# Patient Record
Sex: FEMALE | Race: WHITE | NOT HISPANIC OR LATINO | URBAN - NONMETROPOLITAN AREA
[De-identification: names, ages, dates, MRNs, and addresses within clinical notes are randomized per-mention and may not be internally consistent; named-entity substitution may affect disease eponyms.]

---

## 2022-06-14 ENCOUNTER — WEB ENCOUNTER (OUTPATIENT)
Dept: URBAN - NONMETROPOLITAN AREA CLINIC 2 | Facility: CLINIC | Age: 72
End: 2022-06-14

## 2022-06-14 ENCOUNTER — OFFICE VISIT (OUTPATIENT)
Dept: URBAN - NONMETROPOLITAN AREA CLINIC 2 | Facility: CLINIC | Age: 72
End: 2022-06-14
Payer: MEDICARE

## 2022-06-14 VITALS
WEIGHT: 187 LBS | HEART RATE: 69 BPM | HEIGHT: 63 IN | BODY MASS INDEX: 33.13 KG/M2 | SYSTOLIC BLOOD PRESSURE: 127 MMHG | DIASTOLIC BLOOD PRESSURE: 80 MMHG

## 2022-06-14 DIAGNOSIS — R13.19 ESOPHAGEAL DYSPHAGIA: ICD-10-CM

## 2022-06-14 DIAGNOSIS — Z86.010 PERSONAL HISTORY OF COLONIC POLYPS: ICD-10-CM

## 2022-06-14 DIAGNOSIS — Z80.0 FAMILY HISTORY OF COLON CANCER: ICD-10-CM

## 2022-06-14 PROCEDURE — 99203 OFFICE O/P NEW LOW 30 MIN: CPT | Performed by: NURSE PRACTITIONER

## 2022-06-14 RX ORDER — COLESEVELAM HYDROCHLORIDE 625 MG/1
TAKE 3 TABLETS (1,875 MG) BY ORAL ROUTE 2 TIMES PER DAY WITH MEALS AND LIQUID TABLET, FILM COATED ORAL 2
Qty: 0 | Refills: 0 | Status: ACTIVE | COMMUNITY
Start: 1900-01-01 | End: 1900-01-01

## 2022-06-14 RX ORDER — MECLIZINE HYDROCHLORIDE 12.5 MG/1
TAKE 1-2 TABLETS BY ORAL ROUTE PRN TABLET ORAL 1
Qty: 0 | Refills: 0 | Status: ACTIVE | COMMUNITY
Start: 1900-01-01 | End: 1900-01-01

## 2022-06-14 RX ORDER — OMEPRAZOLE 40 MG/1
1 CAPSULE 30 MINUTES BEFORE MORNING MEAL CAPSULE, DELAYED RELEASE ORAL ONCE A DAY
Qty: 90 | Refills: 3 | Status: ACTIVE | COMMUNITY
Start: 2020-06-19

## 2022-06-14 RX ORDER — ERYTHROMYCIN 5 MG/G
APPLY 1 CM RIBBON INTO THE LOWER CONJUNCTIVAL SAC(S) IN THE AFFECTED EYE(S) BY OPHTHALMIC ROUTE AS DIRECTED OINTMENT OPHTHALMIC 1
Qty: 1 | Refills: 0 | Status: ACTIVE | COMMUNITY
Start: 1900-01-01 | End: 1900-01-01

## 2022-06-14 RX ORDER — MELOXICAM 7.5 MG/1
TAKE AS DIRECTED TABLET ORAL
Qty: 0 | Refills: 0 | Status: ACTIVE | COMMUNITY
Start: 1900-01-01 | End: 1900-01-01

## 2022-06-14 RX ORDER — NITROFURANTOIN 100 MG/1
TAKE AS DIRECTED CAPSULE ORAL
Qty: 0 | Refills: 0 | Status: ACTIVE | COMMUNITY
Start: 1900-01-01 | End: 1900-01-01

## 2022-06-14 RX ORDER — OMEPRAZOLE 20 MG/1
TAKE 1 CAPSULE (20 MG) BY ORAL ROUTE ONCE DAILY BEFORE A MEAL CAPSULE, DELAYED RELEASE ORAL 1
Qty: 0 | Refills: 0 | Status: ACTIVE | COMMUNITY
Start: 1900-01-01 | End: 1900-01-01

## 2022-06-14 RX ORDER — TRAMADOL HYDROCHLORIDE AND ACETAMINOPHEN 37.5; 325 MG/1; MG/1
TAKE 1-2 PO TID PRN PAIN TABLET, COATED ORAL
Qty: 0 | Refills: 0 | Status: ACTIVE | COMMUNITY
Start: 1900-01-01 | End: 1900-01-01

## 2022-06-14 RX ORDER — HYOSCYAMINE SULFATE 0.12 MG/1
TAKE 1 TABLET (0.125 MG) BY ORAL ROUTE 4 TIMES PER DAY FOR 30 DAYS TABLET ORAL
Qty: 120 | Refills: 3 | Status: ACTIVE | COMMUNITY
Start: 1900-01-01 | End: 1900-01-01

## 2022-06-14 RX ORDER — NYSTATIN AND TRIAMCINOLONE ACETONIDE 100000; 1 MG/G; MG/G
APPLY TO THE AFFECTED AREA(S) BY TOPICAL ROUT AS DIRECTED CREAM TOPICAL 2
Qty: 1 | Refills: 0 | Status: ACTIVE | COMMUNITY
Start: 1900-01-01 | End: 1900-01-01

## 2022-06-14 RX ORDER — SODIUM PICOSULFATE, MAGNESIUM OXIDE, AND ANHYDROUS CITRIC ACID 10; 3.5; 12 MG/16.2G; G/16.2G; G/16.2G
AS DIRECTED POWDER, METERED ORAL
Qty: 1 | Refills: 0 | Status: ACTIVE | COMMUNITY
Start: 2016-07-05 | End: 1900-01-01

## 2022-06-14 RX ORDER — SODIUM PICOSULFATE, MAGNESIUM OXIDE, AND ANHYDROUS CITRIC ACID 10; 3.5; 12 MG/160ML; G/160ML; G/160ML
160 ML LIQUID ORAL AS DIRECTED
Qty: 1 KIT | Refills: 0 | OUTPATIENT
Start: 2022-06-14 | End: 2022-06-16

## 2022-06-14 NOTE — HPI-TODAY'S VISIT:
Elli is a 71-year-old female who presents to schedule her screening colonoscopy.  She does have a history of colon polyps.  Also, her mother  of cancer of unknown origin.  She does report that they eventually found her there was some in her colon.  She is due for repeat.  She reports she is doing well from a GI standpoint.  She does take Metamucil daily and this helps her.  She does have a history of requiring EGD with dilation, but states that her swallowing is doing well at this time.  Past medical history is pertinent for diabetes.  She does not take any blood thinners.  She is pretty active and does swimming and aerobics. Sb

## 2022-07-08 ENCOUNTER — TELEPHONE ENCOUNTER (OUTPATIENT)
Dept: URBAN - NONMETROPOLITAN AREA CLINIC 2 | Facility: CLINIC | Age: 72
End: 2022-07-08

## 2022-07-08 RX ORDER — POLYETHYLENE GLYCOL 3350, SODIUM SULFATE ANHYDROUS, SODIUM BICARBONATE, SODIUM CHLORIDE, POTASSIUM CHLORIDE 236; 22.74; 6.74; 5.86; 2.97 G/4L; G/4L; G/4L; G/4L; G/4L
AS DIRECTED POWDER, FOR SOLUTION ORAL ONCE
Qty: 1 GALLON | Refills: 0 | OUTPATIENT
Start: 2022-07-11 | End: 2022-07-12

## 2022-08-23 ENCOUNTER — WEB ENCOUNTER (OUTPATIENT)
Dept: URBAN - NONMETROPOLITAN AREA SURGERY CENTER 1 | Facility: SURGERY CENTER | Age: 72
End: 2022-08-23

## 2022-08-23 ENCOUNTER — OFFICE VISIT (OUTPATIENT)
Dept: URBAN - NONMETROPOLITAN AREA SURGERY CENTER 1 | Facility: SURGERY CENTER | Age: 72
End: 2022-08-23
Payer: MEDICARE

## 2022-08-23 DIAGNOSIS — Z86.010 ADENOMAS PERSONAL HISTORY OF COLONIC POLYPS: ICD-10-CM

## 2022-08-23 DIAGNOSIS — Z09 CHEMOTHERAPY FOLLOW-UP EXAMINATION: ICD-10-CM

## 2022-08-23 PROCEDURE — G0105 COLORECTAL SCRN; HI RISK IND: HCPCS | Performed by: INTERNAL MEDICINE

## 2022-08-23 PROCEDURE — G8907 PT DOC NO EVENTS ON DISCHARG: HCPCS | Performed by: INTERNAL MEDICINE

## 2022-12-16 ENCOUNTER — WEB ENCOUNTER (OUTPATIENT)
Dept: URBAN - NONMETROPOLITAN AREA CLINIC 2 | Facility: CLINIC | Age: 72
End: 2022-12-16

## 2022-12-16 ENCOUNTER — OFFICE VISIT (OUTPATIENT)
Dept: URBAN - NONMETROPOLITAN AREA CLINIC 2 | Facility: CLINIC | Age: 72
End: 2022-12-16
Payer: MEDICARE

## 2022-12-16 VITALS
DIASTOLIC BLOOD PRESSURE: 84 MMHG | HEART RATE: 70 BPM | HEIGHT: 63 IN | WEIGHT: 188 LBS | BODY MASS INDEX: 33.31 KG/M2 | SYSTOLIC BLOOD PRESSURE: 155 MMHG | TEMPERATURE: 97.5 F

## 2022-12-16 DIAGNOSIS — R13.19 ESOPHAGEAL DYSPHAGIA: ICD-10-CM

## 2022-12-16 DIAGNOSIS — Z86.010 PERSONAL HISTORY OF COLONIC POLYPS: ICD-10-CM

## 2022-12-16 DIAGNOSIS — Z80.0 FAMILY HISTORY OF COLON CANCER: ICD-10-CM

## 2022-12-16 PROCEDURE — 99213 OFFICE O/P EST LOW 20 MIN: CPT | Performed by: INTERNAL MEDICINE

## 2022-12-16 RX ORDER — ERYTHROMYCIN 5 MG/G
APPLY 1 CM RIBBON INTO THE LOWER CONJUNCTIVAL SAC(S) IN THE AFFECTED EYE(S) BY OPHTHALMIC ROUTE AS DIRECTED OINTMENT OPHTHALMIC 1
Qty: 1 | Refills: 0 | Status: ACTIVE | COMMUNITY
Start: 1900-01-01

## 2022-12-16 RX ORDER — NITROFURANTOIN 100 MG/1
TAKE AS DIRECTED CAPSULE ORAL
Qty: 0 | Refills: 0 | Status: ACTIVE | COMMUNITY
Start: 1900-01-01

## 2022-12-16 RX ORDER — MECLIZINE HYDROCHLORIDE 12.5 MG/1
TAKE 1-2 TABLETS BY ORAL ROUTE PRN TABLET ORAL 1
Qty: 0 | Refills: 0 | Status: ACTIVE | COMMUNITY
Start: 1900-01-01

## 2022-12-16 RX ORDER — HYOSCYAMINE SULFATE 0.12 MG/1
TAKE 1 TABLET (0.125 MG) BY ORAL ROUTE 4 TIMES PER DAY FOR 30 DAYS TABLET ORAL
Qty: 120 | Refills: 3 | Status: ACTIVE | COMMUNITY
Start: 1900-01-01

## 2022-12-16 RX ORDER — OMEPRAZOLE 40 MG/1
1 CAPSULE 30 MINUTES BEFORE MORNING MEAL CAPSULE, DELAYED RELEASE ORAL ONCE A DAY
Qty: 90 | Refills: 3 | Status: ACTIVE | COMMUNITY
Start: 2020-06-19

## 2022-12-16 RX ORDER — TRAMADOL HYDROCHLORIDE AND ACETAMINOPHEN 37.5; 325 MG/1; MG/1
TAKE 1-2 PO TID PRN PAIN TABLET, COATED ORAL
Qty: 0 | Refills: 0 | Status: ACTIVE | COMMUNITY
Start: 1900-01-01

## 2022-12-16 RX ORDER — MELOXICAM 7.5 MG/1
TAKE AS DIRECTED TABLET ORAL
Qty: 0 | Refills: 0 | Status: ACTIVE | COMMUNITY
Start: 1900-01-01

## 2022-12-16 RX ORDER — NYSTATIN AND TRIAMCINOLONE ACETONIDE 100000; 1 MG/G; MG/G
APPLY TO THE AFFECTED AREA(S) BY TOPICAL ROUT AS DIRECTED CREAM TOPICAL 2
Qty: 1 | Refills: 0 | Status: ACTIVE | COMMUNITY
Start: 1900-01-01

## 2022-12-16 NOTE — HPI-TODAY'S VISIT:
22: Ms. August returns for follow-up of LLQ abdominal pain and constipation.  She had a colonoscopy in August that was normal, no diverticuli noted or polyps.  Today she reports that she is doing well.  She is moving her bowels daily.  22: Elli is a 71-year-old female who presents to schedule her screening colonoscopy.  She does have a history of colon polyps.  Also, her mother  of cancer of unknown origin.  She does report that they eventually found her there was some in her colon.  She is due for repeat.  She reports she is doing well from a GI standpoint.  She does take Metamucil daily and this helps her.  She does have a history of requiring EGD with dilation, but states that her swallowing is doing well at this time.  Past medical history is pertinent for diabetes.  She does not take any blood thinners.  She is pretty active and does swimming and aerobics. Sb

## 2023-02-24 ENCOUNTER — TELEPHONE ENCOUNTER (OUTPATIENT)
Dept: URBAN - NONMETROPOLITAN AREA CLINIC 2 | Facility: CLINIC | Age: 73
End: 2023-02-24

## 2023-03-06 PROBLEM — 428283002: Status: ACTIVE | Noted: 2022-06-14

## 2023-03-06 PROBLEM — 35298007: Status: ACTIVE | Noted: 2023-03-06

## 2023-03-06 PROBLEM — 235595009: Status: ACTIVE | Noted: 2023-03-06

## 2023-03-07 ENCOUNTER — OFFICE VISIT (OUTPATIENT)
Dept: URBAN - METROPOLITAN AREA TELEHEALTH 2 | Facility: TELEHEALTH | Age: 73
End: 2023-03-07
Payer: MEDICARE

## 2023-03-07 VITALS — HEIGHT: 63 IN | BODY MASS INDEX: 33.31 KG/M2 | WEIGHT: 188 LBS

## 2023-03-07 DIAGNOSIS — K59.01 SLOW TRANSIT CONSTIPATION: ICD-10-CM

## 2023-03-07 DIAGNOSIS — Z86.010 PERSONAL HISTORY OF COLONIC POLYPS: ICD-10-CM

## 2023-03-07 DIAGNOSIS — K21.9 GASTROESOPHAGEAL REFLUX DISEASE, UNSPECIFIED WHETHER ESOPHAGITIS PRESENT: ICD-10-CM

## 2023-03-07 DIAGNOSIS — R13.19 ESOPHAGEAL DYSPHAGIA: ICD-10-CM

## 2023-03-07 PROCEDURE — 99214 OFFICE O/P EST MOD 30 MIN: CPT | Performed by: INTERNAL MEDICINE

## 2023-03-07 RX ORDER — MECLIZINE HYDROCHLORIDE 12.5 MG/1
TAKE 1-2 TABLETS BY ORAL ROUTE PRN TABLET ORAL 1
Qty: 0 | Refills: 0 | Status: ACTIVE | COMMUNITY
Start: 1900-01-01

## 2023-03-07 RX ORDER — NITROFURANTOIN 100 MG/1
TAKE AS DIRECTED CAPSULE ORAL
Qty: 0 | Refills: 0 | Status: ACTIVE | COMMUNITY
Start: 1900-01-01

## 2023-03-07 RX ORDER — MELOXICAM 7.5 MG/1
TAKE AS DIRECTED TABLET ORAL
Qty: 0 | Refills: 0 | Status: ACTIVE | COMMUNITY
Start: 1900-01-01

## 2023-03-07 RX ORDER — HYOSCYAMINE SULFATE 0.12 MG/1
TAKE 1 TABLET (0.125 MG) BY ORAL ROUTE 4 TIMES PER DAY FOR 30 DAYS TABLET ORAL
Qty: 120 | Refills: 3 | Status: ACTIVE | COMMUNITY
Start: 1900-01-01

## 2023-03-07 RX ORDER — LINACLOTIDE 145 UG/1
1 CAPSULE AT LEAST 30 MINUTES BEFORE THE FIRST MEAL OF THE DAY ON AN EMPTY STOMACH CAPSULE, GELATIN COATED ORAL ONCE A DAY
Qty: 90 | Refills: 3 | OUTPATIENT
Start: 2023-03-07 | End: 2024-03-01

## 2023-03-07 RX ORDER — TRAMADOL HYDROCHLORIDE AND ACETAMINOPHEN 37.5; 325 MG/1; MG/1
TAKE 1-2 PO TID PRN PAIN TABLET, COATED ORAL
Qty: 0 | Refills: 0 | Status: ACTIVE | COMMUNITY
Start: 1900-01-01

## 2023-03-07 RX ORDER — ERYTHROMYCIN 5 MG/G
APPLY 1 CM RIBBON INTO THE LOWER CONJUNCTIVAL SAC(S) IN THE AFFECTED EYE(S) BY OPHTHALMIC ROUTE AS DIRECTED OINTMENT OPHTHALMIC 1
Qty: 1 | Refills: 0 | Status: ACTIVE | COMMUNITY
Start: 1900-01-01

## 2023-03-07 RX ORDER — NYSTATIN AND TRIAMCINOLONE ACETONIDE 100000; 1 MG/G; MG/G
APPLY TO THE AFFECTED AREA(S) BY TOPICAL ROUT AS DIRECTED CREAM TOPICAL 2
Qty: 1 | Refills: 0 | Status: ACTIVE | COMMUNITY
Start: 1900-01-01

## 2023-03-07 RX ORDER — OMEPRAZOLE 40 MG/1
1 CAPSULE 30 MINUTES BEFORE MORNING MEAL CAPSULE, DELAYED RELEASE ORAL ONCE A DAY
Qty: 90 | Refills: 3 | Status: ACTIVE | COMMUNITY
Start: 2020-06-19

## 2023-03-07 NOTE — HPI-TODAY'S VISIT:
22: Ms. August returns for follow-up of LLQ abdominal pain and constipation.  She had a colonoscopy in August that was normal, no diverticuli noted or polyps.  Today she reports that she is doing well.  She is moving her bowels daily.  22: Elli is a 71-year-old female who presents to schedule her screening colonoscopy.  She does have a history of colon polyps.  Also, her mother  of cancer of unknown origin.  She does report that they eventually found her there was some in her colon.  She is due for repeat.  She reports she is doing well from a GI standpoint.  She does take Metamucil daily and this helps her.  She does have a history of requiring EGD with dilation, but states that her swallowing is doing well at this time.  Past medical history is pertinent for diabetes.  She does not take any blood thinners.  She is pretty active and does swimming and aerobics. Sb Patient returns for a follow-up visit on 3/7/2023.  She states she underwent knee replacement surgery about 2 weeks ago, and was placed on Tylenol 3 and oxycodone.  Shortly thereafter she developed severe constipation, with accompanying abdominal discomfort.  She has since discontinued the Tylenol 3 and oxycodone, and her bowel movements are somewhat improved.  She did call into our office and was instructed to start MiraLAX twice daily with accompanying Colace.  This is helped somewhat, but the constipation abdominal discomfort has persisted.  She does have a history of gastroesophageal reflux, and has been now taking famotidine 40 mg 1-2 times daily her reflux is under control and she is having no swallowing issues.  She has been using anti-inflammatory agents to help control her pain, and I advised her to cut back on these.  She has had a previous history of diverticulitis, which responded to Flagyl, but no episodes recently she did have a normal colonoscopy this past August.

## 2023-03-14 ENCOUNTER — TELEPHONE ENCOUNTER (OUTPATIENT)
Dept: URBAN - NONMETROPOLITAN AREA CLINIC 2 | Facility: CLINIC | Age: 73
End: 2023-03-14

## 2023-03-14 ENCOUNTER — OFFICE VISIT (OUTPATIENT)
Dept: URBAN - METROPOLITAN AREA TELEHEALTH 2 | Facility: TELEHEALTH | Age: 73
End: 2023-03-14
Payer: MEDICARE

## 2023-03-14 DIAGNOSIS — K59.01 SLOW TRANSIT CONSTIPATION: ICD-10-CM

## 2023-03-14 DIAGNOSIS — R13.19 ESOPHAGEAL DYSPHAGIA: ICD-10-CM

## 2023-03-14 PROCEDURE — 99213 OFFICE O/P EST LOW 20 MIN: CPT | Performed by: NURSE PRACTITIONER

## 2023-03-14 RX ORDER — NITROFURANTOIN 100 MG/1
TAKE AS DIRECTED CAPSULE ORAL
Qty: 0 | Refills: 0 | Status: ACTIVE | COMMUNITY
Start: 1900-01-01

## 2023-03-14 RX ORDER — TRAMADOL HYDROCHLORIDE AND ACETAMINOPHEN 37.5; 325 MG/1; MG/1
TAKE 1-2 PO TID PRN PAIN TABLET, COATED ORAL
Qty: 0 | Refills: 0 | Status: ACTIVE | COMMUNITY
Start: 1900-01-01

## 2023-03-14 RX ORDER — MELOXICAM 7.5 MG/1
TAKE AS DIRECTED TABLET ORAL
Qty: 0 | Refills: 0 | Status: ACTIVE | COMMUNITY
Start: 1900-01-01

## 2023-03-14 RX ORDER — HYOSCYAMINE SULFATE 0.125 MG
1 TABLET ON THE TONGUE AND ALLOW TO DISSOLVE  AS NEEDED FOR ABDOMINAL PAIN TABLET,DISINTEGRATING ORAL
Qty: 30 LOZENGE | Refills: 6 | OUTPATIENT
Start: 2023-03-14 | End: 2023-10-10

## 2023-03-14 RX ORDER — MECLIZINE HYDROCHLORIDE 12.5 MG/1
TAKE 1-2 TABLETS BY ORAL ROUTE PRN TABLET ORAL 1
Qty: 0 | Refills: 0 | Status: ACTIVE | COMMUNITY
Start: 1900-01-01

## 2023-03-14 RX ORDER — NYSTATIN AND TRIAMCINOLONE ACETONIDE 100000; 1 MG/G; MG/G
APPLY TO THE AFFECTED AREA(S) BY TOPICAL ROUT AS DIRECTED CREAM TOPICAL 2
Qty: 1 | Refills: 0 | Status: ACTIVE | COMMUNITY
Start: 1900-01-01

## 2023-03-14 RX ORDER — HYOSCYAMINE SULFATE 0.12 MG/1
TAKE 1 TABLET (0.125 MG) BY ORAL ROUTE 4 TIMES PER DAY FOR 30 DAYS TABLET ORAL
Qty: 120 | Refills: 3 | Status: ACTIVE | COMMUNITY
Start: 1900-01-01

## 2023-03-14 RX ORDER — ERYTHROMYCIN 5 MG/G
APPLY 1 CM RIBBON INTO THE LOWER CONJUNCTIVAL SAC(S) IN THE AFFECTED EYE(S) BY OPHTHALMIC ROUTE AS DIRECTED OINTMENT OPHTHALMIC 1
Qty: 1 | Refills: 0 | Status: ACTIVE | COMMUNITY
Start: 1900-01-01

## 2023-03-14 RX ORDER — LINACLOTIDE 145 UG/1
1 CAPSULE AT LEAST 30 MINUTES BEFORE THE FIRST MEAL OF THE DAY ON AN EMPTY STOMACH CAPSULE, GELATIN COATED ORAL ONCE A DAY
Qty: 90 | Refills: 3 | Status: ACTIVE | COMMUNITY
Start: 2023-03-07 | End: 2024-03-01

## 2023-03-14 RX ORDER — OMEPRAZOLE 40 MG/1
1 CAPSULE 30 MINUTES BEFORE MORNING MEAL CAPSULE, DELAYED RELEASE ORAL ONCE A DAY
Qty: 90 | Refills: 3 | Status: ACTIVE | COMMUNITY
Start: 2020-06-19

## 2023-03-14 NOTE — HPI-TODAY'S VISIT:
Patient returns for telehealth to discuss constipation/loose stools.  She states she underwent knee replacement surgery about 2 weeks ago, and was placed on Tylenol 3 and oxycodone.  Shortly thereafter she developed severe constipation, with accompanying abdominal discomfort.  She has since discontinued the Tylenol 3 and oxycodone, and her bowel movements are somewhat improved, but she is still having intermittent small BMs followed by abd pain and loose stools. She has had a previous history of diverticulitis, which responded to Flagyl, but no episodes recently she did have a normal colonoscopy this past August 2022. She does have a history of requiring EGD with dilation, but states that her swallowing is doing well at this time.  Past medical history is pertinent for diabetes.  She does not take any blood thinners.  sb

## 2023-03-14 NOTE — PHYSICAL EXAM CONSTITUTIONAL:
pleasant, well nourished, well developed, in no acute distress , normal communication ability [Negative] : Heme/Lymph

## 2023-03-16 ENCOUNTER — TELEPHONE ENCOUNTER (OUTPATIENT)
Dept: URBAN - NONMETROPOLITAN AREA CLINIC 2 | Facility: CLINIC | Age: 73
End: 2023-03-16

## 2023-03-16 RX ORDER — HYOSCYAMINE SULFATE 0.125 MG
1 TABLET ON THE TONGUE AND ALLOW TO DISSOLVE  AS NEEDED FOR ABDOMINAL PAIN TABLET,DISINTEGRATING ORAL
Qty: 30 LOZENGE | Refills: 6
Start: 2023-03-14 | End: 2023-10-12

## 2023-04-11 ENCOUNTER — OFFICE VISIT (OUTPATIENT)
Dept: URBAN - NONMETROPOLITAN AREA CLINIC 2 | Facility: CLINIC | Age: 73
End: 2023-04-11
Payer: MEDICARE

## 2023-04-11 VITALS
BODY MASS INDEX: 32.96 KG/M2 | HEART RATE: 82 BPM | WEIGHT: 186 LBS | DIASTOLIC BLOOD PRESSURE: 70 MMHG | HEIGHT: 63 IN | TEMPERATURE: 98.3 F | SYSTOLIC BLOOD PRESSURE: 134 MMHG

## 2023-04-11 DIAGNOSIS — Z86.010 PERSONAL HISTORY OF COLONIC POLYPS: ICD-10-CM

## 2023-04-11 DIAGNOSIS — K59.01 SLOW TRANSIT CONSTIPATION: ICD-10-CM

## 2023-04-11 DIAGNOSIS — Z80.0 FAMILY HISTORY OF COLON CANCER: ICD-10-CM

## 2023-04-11 DIAGNOSIS — R13.19 ESOPHAGEAL DYSPHAGIA: ICD-10-CM

## 2023-04-11 PROCEDURE — 99213 OFFICE O/P EST LOW 20 MIN: CPT | Performed by: NURSE PRACTITIONER

## 2023-04-11 RX ORDER — ERYTHROMYCIN 5 MG/G
APPLY 1 CM RIBBON INTO THE LOWER CONJUNCTIVAL SAC(S) IN THE AFFECTED EYE(S) BY OPHTHALMIC ROUTE AS DIRECTED OINTMENT OPHTHALMIC 1
Qty: 1 | Refills: 0 | Status: ACTIVE | COMMUNITY
Start: 1900-01-01

## 2023-04-11 RX ORDER — HYOSCYAMINE SULFATE 0.125 MG
1 TABLET ON THE TONGUE AND ALLOW TO DISSOLVE  AS NEEDED FOR ABDOMINAL PAIN TABLET,DISINTEGRATING ORAL
Qty: 30 LOZENGE | Refills: 6 | OUTPATIENT

## 2023-04-11 RX ORDER — HYOSCYAMINE SULFATE 0.12 MG/1
TAKE 1 TABLET (0.125 MG) BY ORAL ROUTE 4 TIMES PER DAY FOR 30 DAYS TABLET ORAL
Qty: 120 | Refills: 3 | Status: ACTIVE | COMMUNITY
Start: 1900-01-01

## 2023-04-11 RX ORDER — NYSTATIN AND TRIAMCINOLONE ACETONIDE 100000; 1 MG/G; MG/G
APPLY TO THE AFFECTED AREA(S) BY TOPICAL ROUT AS DIRECTED CREAM TOPICAL 2
Qty: 1 | Refills: 0 | Status: ACTIVE | COMMUNITY
Start: 1900-01-01

## 2023-04-11 RX ORDER — MELOXICAM 7.5 MG/1
TAKE AS DIRECTED TABLET ORAL
Qty: 0 | Refills: 0 | Status: ACTIVE | COMMUNITY
Start: 1900-01-01

## 2023-04-11 RX ORDER — TRAMADOL HYDROCHLORIDE AND ACETAMINOPHEN 37.5; 325 MG/1; MG/1
TAKE 1-2 PO TID PRN PAIN TABLET, COATED ORAL
Qty: 0 | Refills: 0 | Status: ACTIVE | COMMUNITY
Start: 1900-01-01

## 2023-04-11 RX ORDER — OMEPRAZOLE 40 MG/1
1 CAPSULE 30 MINUTES BEFORE MORNING MEAL CAPSULE, DELAYED RELEASE ORAL ONCE A DAY
Qty: 90 | Refills: 3 | Status: ACTIVE | COMMUNITY
Start: 2020-06-19

## 2023-04-11 RX ORDER — HYOSCYAMINE SULFATE 0.125 MG
1 TABLET ON THE TONGUE AND ALLOW TO DISSOLVE  AS NEEDED FOR ABDOMINAL PAIN TABLET,DISINTEGRATING ORAL
Qty: 30 LOZENGE | Refills: 6 | Status: ACTIVE | COMMUNITY
Start: 2023-03-14 | End: 2023-10-12

## 2023-04-11 RX ORDER — MECLIZINE HYDROCHLORIDE 12.5 MG/1
TAKE 1-2 TABLETS BY ORAL ROUTE PRN TABLET ORAL 1
Qty: 0 | Refills: 0 | Status: ACTIVE | COMMUNITY
Start: 1900-01-01

## 2023-04-11 RX ORDER — LINACLOTIDE 145 UG/1
1 CAPSULE AT LEAST 30 MINUTES BEFORE THE FIRST MEAL OF THE DAY ON AN EMPTY STOMACH CAPSULE, GELATIN COATED ORAL ONCE A DAY
Qty: 90 | Refills: 3 | Status: ACTIVE | COMMUNITY
Start: 2023-03-07 | End: 2024-03-01

## 2023-04-11 RX ORDER — NITROFURANTOIN 100 MG/1
TAKE AS DIRECTED CAPSULE ORAL
Qty: 0 | Refills: 0 | Status: ACTIVE | COMMUNITY
Start: 1900-01-01

## 2023-04-11 NOTE — HPI-TODAY'S VISIT:
Patient returns for f/u constipation/loose stools.  Previously-She states she underwent knee replacement surgery about 2 weeks ago, and was placed on Tylenol 3 and oxycodone.  Shortly thereafter she developed severe constipation, with accompanying abdominal discomfort.  started on miralax with resolution. now, taking 1/4 tdp of miralax daily. no gi complaints today.    She has had a previous history of diverticulitis, which responded to Flagyl, but no episodes recently she did have a normal colonoscopy this past August 2022. She does have a history of requiring EGD with dilation, but states that her swallowing is doing well at this time.  Past medical history is pertinent for diabetes.  She does not take any blood thinners.  sb

## 2023-04-27 ENCOUNTER — OFFICE VISIT (OUTPATIENT)
Dept: URBAN - METROPOLITAN AREA TELEHEALTH 2 | Facility: TELEHEALTH | Age: 73
End: 2023-04-27

## 2023-06-15 ENCOUNTER — TELEPHONE ENCOUNTER (OUTPATIENT)
Dept: URBAN - NONMETROPOLITAN AREA CLINIC 2 | Facility: CLINIC | Age: 73
End: 2023-06-15

## 2023-07-13 ENCOUNTER — OFFICE VISIT (OUTPATIENT)
Dept: URBAN - NONMETROPOLITAN AREA CLINIC 2 | Facility: CLINIC | Age: 73
End: 2023-07-13
Payer: MEDICARE

## 2023-07-13 ENCOUNTER — DASHBOARD ENCOUNTERS (OUTPATIENT)
Age: 73
End: 2023-07-13

## 2023-07-13 VITALS
TEMPERATURE: 98.1 F | BODY MASS INDEX: 33.49 KG/M2 | SYSTOLIC BLOOD PRESSURE: 110 MMHG | HEIGHT: 63 IN | HEART RATE: 79 BPM | DIASTOLIC BLOOD PRESSURE: 69 MMHG | WEIGHT: 189 LBS

## 2023-07-13 DIAGNOSIS — K59.01 SLOW TRANSIT CONSTIPATION: ICD-10-CM

## 2023-07-13 DIAGNOSIS — Z86.010 PERSONAL HISTORY OF COLONIC POLYPS: ICD-10-CM

## 2023-07-13 DIAGNOSIS — Z80.0 FAMILY HISTORY OF COLON CANCER: ICD-10-CM

## 2023-07-13 DIAGNOSIS — R13.19 ESOPHAGEAL DYSPHAGIA: ICD-10-CM

## 2023-07-13 PROCEDURE — 99213 OFFICE O/P EST LOW 20 MIN: CPT | Performed by: NURSE PRACTITIONER

## 2023-07-13 RX ORDER — OMEPRAZOLE 40 MG/1
1 CAPSULE 30 MINUTES BEFORE MORNING MEAL CAPSULE, DELAYED RELEASE ORAL ONCE A DAY
Qty: 90 | Refills: 3 | Status: ACTIVE | COMMUNITY
Start: 2020-06-19

## 2023-07-13 RX ORDER — MELOXICAM 7.5 MG/1
TAKE AS DIRECTED TABLET ORAL
Qty: 0 | Refills: 0 | Status: ACTIVE | COMMUNITY
Start: 1900-01-01

## 2023-07-13 RX ORDER — NYSTATIN AND TRIAMCINOLONE ACETONIDE 100000; 1 MG/G; MG/G
APPLY TO THE AFFECTED AREA(S) BY TOPICAL ROUT AS DIRECTED CREAM TOPICAL 2
Qty: 1 | Refills: 0 | Status: ACTIVE | COMMUNITY
Start: 1900-01-01

## 2023-07-13 RX ORDER — TRAMADOL HYDROCHLORIDE AND ACETAMINOPHEN 37.5; 325 MG/1; MG/1
TAKE 1-2 PO TID PRN PAIN TABLET, COATED ORAL
Qty: 0 | Refills: 0 | Status: ACTIVE | COMMUNITY
Start: 1900-01-01

## 2023-07-13 RX ORDER — NITROFURANTOIN 100 MG/1
TAKE AS DIRECTED CAPSULE ORAL
Qty: 0 | Refills: 0 | Status: ACTIVE | COMMUNITY
Start: 1900-01-01

## 2023-07-13 RX ORDER — ERYTHROMYCIN 5 MG/G
APPLY 1 CM RIBBON INTO THE LOWER CONJUNCTIVAL SAC(S) IN THE AFFECTED EYE(S) BY OPHTHALMIC ROUTE AS DIRECTED OINTMENT OPHTHALMIC 1
Qty: 1 | Refills: 0 | Status: ACTIVE | COMMUNITY
Start: 1900-01-01

## 2023-07-13 RX ORDER — HYOSCYAMINE SULFATE 0.12 MG/1
TAKE 1 TABLET (0.125 MG) BY ORAL ROUTE 4 TIMES PER DAY FOR 30 DAYS TABLET ORAL
Qty: 120 | Refills: 3 | Status: ACTIVE | COMMUNITY
Start: 1900-01-01

## 2023-07-13 RX ORDER — MECLIZINE HYDROCHLORIDE 12.5 MG/1
TAKE 1-2 TABLETS BY ORAL ROUTE PRN TABLET ORAL 1
Qty: 0 | Refills: 0 | Status: ACTIVE | COMMUNITY
Start: 1900-01-01

## 2023-07-13 RX ORDER — HYOSCYAMINE SULFATE 0.125 MG
1 TABLET ON THE TONGUE AND ALLOW TO DISSOLVE  AS NEEDED FOR ABDOMINAL PAIN TABLET,DISINTEGRATING ORAL
Qty: 30 LOZENGE | Refills: 6 | Status: ACTIVE | COMMUNITY

## 2023-07-13 RX ORDER — LINACLOTIDE 145 UG/1
1 CAPSULE AT LEAST 30 MINUTES BEFORE THE FIRST MEAL OF THE DAY ON AN EMPTY STOMACH CAPSULE, GELATIN COATED ORAL ONCE A DAY
Qty: 90 | Refills: 3 | Status: ACTIVE | COMMUNITY
Start: 2023-03-07 | End: 2024-03-01

## 2023-07-13 RX ORDER — NEBIVOLOL 2.5 MG/1
TABLET ORAL
Qty: 90 TABLET | Status: ON HOLD | COMMUNITY

## 2023-07-13 RX ORDER — AMLODIPINE BESYLATE 2.5 MG/1
TABLET ORAL
Qty: 30 TABLET | Status: ACTIVE | COMMUNITY

## 2023-07-13 NOTE — HPI-TODAY'S VISIT:
Patient returns for f/u constipation/loose stools.  Previously-She states she underwent knee replacement surgery  and was placed on Tylenol 3 and oxycodone.  Shortly thereafter she developed severe constipation, with accompanying abdominal discomfort.  started on miralax with resolution.   She started improving, stopped miralax, and resumed her daily "fiber concoction" and is doing well as long as she remembers to take this. no abdominal pain at this time and bowels are moving failrly regularly.    She has had a previous history of diverticulitis, which responded to Flagyl, but no episodes recently she did have a normal colonoscopy this past August 2022. She does have a history of requiring EGD with dilation, but states that her swallowing is doing well at this time.  Past medical history is pertinent for diabetes.  She does not take any blood thinners.  sb

## 2024-12-02 ENCOUNTER — OFFICE VISIT (OUTPATIENT)
Dept: URBAN - NONMETROPOLITAN AREA CLINIC 13 | Facility: CLINIC | Age: 74
End: 2024-12-02
Payer: MEDICARE

## 2024-12-02 ENCOUNTER — LAB OUTSIDE AN ENCOUNTER (OUTPATIENT)
Dept: URBAN - NONMETROPOLITAN AREA CLINIC 13 | Facility: CLINIC | Age: 74
End: 2024-12-02

## 2024-12-02 ENCOUNTER — TELEPHONE ENCOUNTER (OUTPATIENT)
Dept: URBAN - NONMETROPOLITAN AREA CLINIC 2 | Facility: CLINIC | Age: 74
End: 2024-12-02

## 2024-12-02 VITALS
WEIGHT: 184.8 LBS | HEART RATE: 83 BPM | DIASTOLIC BLOOD PRESSURE: 88 MMHG | HEIGHT: 63 IN | BODY MASS INDEX: 32.74 KG/M2 | SYSTOLIC BLOOD PRESSURE: 159 MMHG

## 2024-12-02 DIAGNOSIS — Z80.0 FAMILY HISTORY OF COLON CANCER: ICD-10-CM

## 2024-12-02 DIAGNOSIS — Z12.11 COLON CANCER SCREENING: ICD-10-CM

## 2024-12-02 DIAGNOSIS — K59.01 SLOW TRANSIT CONSTIPATION: ICD-10-CM

## 2024-12-02 DIAGNOSIS — R13.19 ESOPHAGEAL DYSPHAGIA: ICD-10-CM

## 2024-12-02 PROCEDURE — 99214 OFFICE O/P EST MOD 30 MIN: CPT | Performed by: NURSE PRACTITIONER

## 2024-12-02 RX ORDER — TRAMADOL HYDROCHLORIDE AND ACETAMINOPHEN 37.5; 325 MG/1; MG/1
TAKE 1-2 PO TID PRN PAIN TABLET, COATED ORAL
Qty: 0 | Refills: 0 | Status: ACTIVE | COMMUNITY
Start: 1900-01-01

## 2024-12-02 RX ORDER — MELOXICAM 7.5 MG/1
TAKE AS DIRECTED TABLET ORAL
Qty: 0 | Refills: 0 | Status: ACTIVE | COMMUNITY
Start: 1900-01-01

## 2024-12-02 RX ORDER — AMLODIPINE BESYLATE 2.5 MG/1
TABLET ORAL
Qty: 30 TABLET | Status: ACTIVE | COMMUNITY

## 2024-12-02 RX ORDER — HYOSCYAMINE SULFATE 0.125 MG
1 TABLET ON THE TONGUE AND ALLOW TO DISSOLVE  AS NEEDED FOR ABDOMINAL PAIN TABLET,DISINTEGRATING ORAL
Qty: 30 LOZENGE | Refills: 6 | Status: ACTIVE | COMMUNITY

## 2024-12-02 RX ORDER — MECLIZINE HYDROCHLORIDE 12.5 MG/1
TAKE 1-2 TABLETS BY ORAL ROUTE PRN TABLET ORAL 1
Qty: 0 | Refills: 0 | Status: ACTIVE | COMMUNITY
Start: 1900-01-01

## 2024-12-02 RX ORDER — ERYTHROMYCIN 5 MG/G
APPLY 1 CM RIBBON INTO THE LOWER CONJUNCTIVAL SAC(S) IN THE AFFECTED EYE(S) BY OPHTHALMIC ROUTE AS DIRECTED OINTMENT OPHTHALMIC 1
Qty: 1 | Refills: 0 | Status: ACTIVE | COMMUNITY
Start: 1900-01-01

## 2024-12-02 RX ORDER — NEBIVOLOL 2.5 MG/1
TABLET ORAL
Qty: 90 TABLET | Status: ON HOLD | COMMUNITY

## 2024-12-02 RX ORDER — HYOSCYAMINE SULFATE 0.12 MG/1
TAKE 1 TABLET (0.125 MG) BY ORAL ROUTE 4 TIMES PER DAY FOR 30 DAYS TABLET ORAL
Qty: 120 | Refills: 3 | Status: ACTIVE | COMMUNITY
Start: 1900-01-01

## 2024-12-02 RX ORDER — NITROFURANTOIN 100 MG/1
TAKE AS DIRECTED CAPSULE ORAL
Qty: 0 | Refills: 0 | Status: ACTIVE | COMMUNITY
Start: 1900-01-01

## 2024-12-02 RX ORDER — NYSTATIN AND TRIAMCINOLONE ACETONIDE 100000; 1 MG/G; MG/G
APPLY TO THE AFFECTED AREA(S) BY TOPICAL ROUT AS DIRECTED CREAM TOPICAL 2
Qty: 1 | Refills: 0 | Status: ACTIVE | COMMUNITY
Start: 1900-01-01

## 2024-12-02 RX ORDER — OMEPRAZOLE 40 MG/1
1 CAPSULE 30 MINUTES BEFORE MORNING MEAL CAPSULE, DELAYED RELEASE ORAL ONCE A DAY
Qty: 90 | Refills: 3 | Status: ACTIVE | COMMUNITY
Start: 2020-06-19

## 2024-12-02 NOTE — PHYSICAL EXAM NECK/THYROID:
A message was put through an addendum telephone message. I am opening up a new encounter.  Pt. States none of the 5 confirmed faxes have gone through to to [facility], the last fax number was even a different number for them. Patient wants to know what else can be done. Earlier I offered to mail or have her get someone to  the order. She had wanted it e mailed and I can not do this.     I just left pt. A v.m., at this point, I would suggest going with a different company. I asked her to call us back.        normal appearance

## 2024-12-02 NOTE — HPI-TODAY'S VISIT:
Patient returns for f/u constipation/loose stools.  Previously-She states she underwent knee replacement surgery  and was placed on Tylenol 3 and oxycodone.  Shortly thereafter she developed severe constipation, with accompanying abdominal discomfort.  started on miralax with resolution.   She started improving, stopped miralax, and resumed her daily "fiber concoction" and is doing well as long as she remembers to take this. no abdominal pain at this time and bowels are moving failrly regularly. However, was having incontinence with urine and urgency with stool a few months ago. PT for her lower ext actually helped some with this and she wonders about pelvic floor.    She has had a previous history of diverticulitis, which responded to Flagyl, but no episodes recently she did have a normal colonoscopy this past August 2022. She does have a history of requiring EGD with dilation, but states that her swallowing is doing well at this time.  Past medical history is pertinent for diabetes.  She does not take any blood thinners.  sb

## 2025-03-03 ENCOUNTER — OFFICE VISIT (OUTPATIENT)
Dept: URBAN - NONMETROPOLITAN AREA CLINIC 13 | Facility: CLINIC | Age: 75
End: 2025-03-03
Payer: MEDICARE

## 2025-03-03 VITALS
WEIGHT: 185 LBS | HEIGHT: 63 IN | DIASTOLIC BLOOD PRESSURE: 77 MMHG | HEART RATE: 92 BPM | BODY MASS INDEX: 32.78 KG/M2 | SYSTOLIC BLOOD PRESSURE: 140 MMHG

## 2025-03-03 DIAGNOSIS — Z12.11 COLON CANCER SCREENING: ICD-10-CM

## 2025-03-03 DIAGNOSIS — K59.01 SLOW TRANSIT CONSTIPATION: ICD-10-CM

## 2025-03-03 DIAGNOSIS — R13.19 ESOPHAGEAL DYSPHAGIA: ICD-10-CM

## 2025-03-03 DIAGNOSIS — Z80.0 FAMILY HISTORY OF COLON CANCER: ICD-10-CM

## 2025-03-03 PROCEDURE — 99214 OFFICE O/P EST MOD 30 MIN: CPT | Performed by: NURSE PRACTITIONER

## 2025-03-03 RX ORDER — HYOSCYAMINE SULFATE 0.125 MG
1 TABLET ON THE TONGUE AND ALLOW TO DISSOLVE  AS NEEDED FOR ABDOMINAL PAIN TABLET,DISINTEGRATING ORAL
Qty: 30 LOZENGE | Refills: 6 | Status: ACTIVE | COMMUNITY

## 2025-03-03 RX ORDER — MECLIZINE HYDROCHLORIDE 12.5 MG/1
TAKE 1-2 TABLETS BY ORAL ROUTE PRN TABLET ORAL 1
Qty: 0 | Refills: 0 | Status: ACTIVE | COMMUNITY
Start: 1900-01-01

## 2025-03-03 RX ORDER — AMLODIPINE BESYLATE 2.5 MG/1
TABLET ORAL
Qty: 30 TABLET | Status: ACTIVE | COMMUNITY

## 2025-03-03 RX ORDER — NITROFURANTOIN 100 MG/1
TAKE AS DIRECTED CAPSULE ORAL
Qty: 0 | Refills: 0 | Status: ACTIVE | COMMUNITY
Start: 1900-01-01

## 2025-03-03 RX ORDER — TRAMADOL HYDROCHLORIDE AND ACETAMINOPHEN 37.5; 325 MG/1; MG/1
TAKE 1-2 PO TID PRN PAIN TABLET, COATED ORAL
Qty: 0 | Refills: 0 | Status: ACTIVE | COMMUNITY
Start: 1900-01-01

## 2025-03-03 RX ORDER — NYSTATIN AND TRIAMCINOLONE ACETONIDE 100000; 1 MG/G; MG/G
APPLY TO THE AFFECTED AREA(S) BY TOPICAL ROUT AS DIRECTED CREAM TOPICAL 2
Qty: 1 | Refills: 0 | Status: ACTIVE | COMMUNITY
Start: 1900-01-01

## 2025-03-03 RX ORDER — ERYTHROMYCIN 5 MG/G
APPLY 1 CM RIBBON INTO THE LOWER CONJUNCTIVAL SAC(S) IN THE AFFECTED EYE(S) BY OPHTHALMIC ROUTE AS DIRECTED OINTMENT OPHTHALMIC 1
Qty: 1 | Refills: 0 | Status: ACTIVE | COMMUNITY
Start: 1900-01-01

## 2025-03-03 RX ORDER — MELOXICAM 7.5 MG/1
TAKE AS DIRECTED TABLET ORAL
Qty: 0 | Refills: 0 | Status: ACTIVE | COMMUNITY
Start: 1900-01-01

## 2025-03-03 RX ORDER — OMEPRAZOLE 40 MG/1
1 CAPSULE 30 MINUTES BEFORE MORNING MEAL CAPSULE, DELAYED RELEASE ORAL ONCE A DAY
Qty: 90 | Refills: 3 | Status: ACTIVE | COMMUNITY
Start: 2020-06-19

## 2025-03-03 RX ORDER — HYOSCYAMINE SULFATE 0.12 MG/1
TAKE 1 TABLET (0.125 MG) BY ORAL ROUTE 4 TIMES PER DAY FOR 30 DAYS TABLET ORAL
Qty: 120 | Refills: 3 | Status: ACTIVE | COMMUNITY
Start: 1900-01-01

## 2025-03-03 RX ORDER — NEBIVOLOL 2.5 MG/1
TABLET ORAL
Qty: 90 TABLET | Status: ON HOLD | COMMUNITY

## 2025-03-03 NOTE — HPI-TODAY'S VISIT:
Patient returns for f/u constipation/loose stools.  Previously-She states she underwent knee replacement surgery  and was placed on Tylenol 3 and oxycodone.  Shortly thereafter she developed severe constipation, with accompanying abdominal discomfort.  started on miralax with resolution.   She started improving, stopped miralax, and resumed her daily "fiber concoction" and is doing well as long as she remembers to take this. no abdominal pain at this time and bowels are moving failrly regularly. However, was having incontinence with urine and urgency with stool a few months ago. PT for her lower ext actually helped some with this and she wonders about pelvic floor.    She has had a previous history of diverticulitis, which responded to Flagyl, but no episodes recently she did have a normal colonoscopy this past August 2022. She does have a history of requiring EGD with dilation, but states that her swallowing is doing well at this time.  Past medical history is pertinent for diabetes.  She does not take any blood thinners.  sb 3/3/2025 Elli returns for follow-up of constipation.  She is very quick to get fecal incontinence, but she is actually done okay the last few months.  She had an episode last week of some abdominal pain that was brief following ingestion of fish.  She feels like she may have been constipated as she had moved her bowels in several days.  She went on some Colace and fiber and this more or less resolved.  She prefers not to move forward with anorectal manometry sb

## 2025-08-21 ENCOUNTER — OFFICE VISIT (OUTPATIENT)
Dept: URBAN - NONMETROPOLITAN AREA CLINIC 2 | Facility: CLINIC | Age: 75
End: 2025-08-21
Payer: MEDICARE

## 2025-08-21 DIAGNOSIS — Z12.11 COLON CANCER SCREENING: ICD-10-CM

## 2025-08-21 DIAGNOSIS — R13.19 ESOPHAGEAL DYSPHAGIA: ICD-10-CM

## 2025-08-21 DIAGNOSIS — K59.01 SLOW TRANSIT CONSTIPATION: ICD-10-CM

## 2025-08-21 DIAGNOSIS — R19.5 LOOSE STOOLS: ICD-10-CM

## 2025-08-21 PROCEDURE — 99214 OFFICE O/P EST MOD 30 MIN: CPT | Performed by: NURSE PRACTITIONER

## 2025-08-21 RX ORDER — HYOSCYAMINE SULFATE 0.125 MG
1 TABLET ON THE TONGUE AND ALLOW TO DISSOLVE  AS NEEDED FOR ABDOMINAL PAIN TABLET,DISINTEGRATING ORAL
Qty: 30 LOZENGE | Refills: 6 | Status: ACTIVE | COMMUNITY

## 2025-08-21 RX ORDER — MECLIZINE HYDROCHLORIDE 12.5 MG/1
TAKE 1-2 TABLETS BY ORAL ROUTE PRN TABLET ORAL 1
Qty: 0 | Refills: 0 | Status: ACTIVE | COMMUNITY
Start: 1900-01-01

## 2025-08-21 RX ORDER — NEBIVOLOL 2.5 MG/1
TABLET ORAL
Qty: 90 TABLET | Status: ON HOLD | COMMUNITY

## 2025-08-21 RX ORDER — AMLODIPINE BESYLATE 2.5 MG/1
TABLET ORAL
Qty: 30 TABLET | Status: ACTIVE | COMMUNITY

## 2025-08-21 RX ORDER — BUDESONIDE 0.5 MG/2ML
1 ML SUSPENSION RESPIRATORY (INHALATION) TWICE A DAY
Status: ACTIVE | COMMUNITY

## 2025-08-21 RX ORDER — HYOSCYAMINE SULFATE 0.12 MG/1
TAKE 1 TABLET (0.125 MG) BY ORAL ROUTE 4 TIMES PER DAY FOR 30 DAYS TABLET ORAL
Qty: 120 | Refills: 3 | Status: ACTIVE | COMMUNITY
Start: 1900-01-01

## 2025-08-21 RX ORDER — ERYTHROMYCIN 5 MG/G
APPLY 1 CM RIBBON INTO THE LOWER CONJUNCTIVAL SAC(S) IN THE AFFECTED EYE(S) BY OPHTHALMIC ROUTE AS DIRECTED OINTMENT OPHTHALMIC 1
Qty: 1 | Refills: 0 | Status: ACTIVE | COMMUNITY
Start: 1900-01-01

## 2025-08-21 RX ORDER — PIOGLITAZONE 30 MG/1
1 TABLET TABLET ORAL ONCE A DAY
Status: ACTIVE | COMMUNITY

## 2025-08-21 RX ORDER — MELOXICAM 7.5 MG/1
TAKE AS DIRECTED TABLET ORAL
Qty: 0 | Refills: 0 | Status: ACTIVE | COMMUNITY
Start: 1900-01-01

## 2025-08-21 RX ORDER — OMEPRAZOLE 40 MG/1
1 CAPSULE 30 MINUTES BEFORE MORNING MEAL CAPSULE, DELAYED RELEASE ORAL ONCE A DAY
Qty: 90 | Refills: 3 | Status: ACTIVE | COMMUNITY
Start: 2020-06-19

## 2025-08-21 RX ORDER — TRAMADOL HYDROCHLORIDE AND ACETAMINOPHEN 37.5; 325 MG/1; MG/1
TAKE 1-2 PO TID PRN PAIN TABLET, COATED ORAL
Qty: 0 | Refills: 0 | Status: ACTIVE | COMMUNITY
Start: 1900-01-01

## 2025-08-21 RX ORDER — NITROFURANTION 100 MG/1
TAKE AS DIRECTED CAPSULE ORAL
Qty: 0 | Refills: 0 | Status: ACTIVE | COMMUNITY
Start: 1900-01-01

## 2025-08-21 RX ORDER — NYSTATIN AND TRIAMCINOLONE ACETONIDE 100000; 1 MG/G; MG/G
APPLY TO THE AFFECTED AREA(S) BY TOPICAL ROUT AS DIRECTED CREAM TOPICAL 2
Qty: 1 | Refills: 0 | Status: ACTIVE | COMMUNITY
Start: 1900-01-01

## 2025-08-22 LAB
IMMUNOGLOBULIN A: 198
INTERPRETATION: (no result)
TISSUE TRANSGLUTAMINASE AB, IGA: <1

## 2025-08-26 ENCOUNTER — TELEPHONE ENCOUNTER (OUTPATIENT)
Dept: URBAN - NONMETROPOLITAN AREA CLINIC 2 | Facility: CLINIC | Age: 75
End: 2025-08-26